# Patient Record
Sex: MALE | Race: WHITE | URBAN - METROPOLITAN AREA
[De-identification: names, ages, dates, MRNs, and addresses within clinical notes are randomized per-mention and may not be internally consistent; named-entity substitution may affect disease eponyms.]

---

## 2017-01-02 ENCOUNTER — THERAPY VISIT (OUTPATIENT)
Dept: PHYSICAL THERAPY | Facility: CLINIC | Age: 42
End: 2017-01-02
Payer: COMMERCIAL

## 2017-01-02 DIAGNOSIS — M25.511 ACUTE PAIN OF RIGHT SHOULDER: Primary | ICD-10-CM

## 2017-01-02 PROCEDURE — 97110 THERAPEUTIC EXERCISES: CPT | Mod: GP | Performed by: PHYSICAL THERAPIST

## 2017-01-02 PROCEDURE — 97112 NEUROMUSCULAR REEDUCATION: CPT | Mod: GP | Performed by: PHYSICAL THERAPIST

## 2017-01-02 NOTE — PROGRESS NOTES
Subjective:    HPI                    Objective:    System    Physical Exam    General     ROS    Assessment/Plan:      PROGRESS  REPORT    Progress reporting period is from Nov 3, 2016 to January 2, 2017. 3 visits .     SUBJECTIVE  Subjective: Prashant returns to therapy with chief complaint of minimal R shoulder pain yet when attemptingto reach, lift or lay on his R side at night. Overall is improved than 6 weeks ago . He notes that his compliance has been hit and miss with self care for the past 3-4 weeks due to holiday activity.      Current Pain level: 3/10   Initial Pain level: 5/10     Changes in function: No changes noted in function since last SOAP note   Adverse reactions: None;   ,         OBJECTIVE  Objective: Painful arc minimal present with FF and abduction. posterior reach is T7 vs T4, ERP. MMT reproduces shoulder pain with Adduction and IR. HE has golden slightly modified with high reps and low weight strengthening into a standing position based on good control of his scap mechanics and total ROM. Follow up planned in 2-4 weeks with potential DC at that point.       ASSESSMENT/PLAN  Updated problem list and treatment plan: Diagnosis 1:  R shoulder early OA, Impingememt     STG/LTGs have been met or progress has been made towards goals:  Yes (See Goal flow sheet completed today.)  Assessment of Progress: The patient's condition is improving.  The patient's condition has potential to improve.  Self Management Plans:  Patient has been instructed in a home treatment program.  I have re-evaluated this patient and find that the nature, scope, duration and intensity of the therapy is appropriate for the medical condition of the patient.        Recommendations:  This patient would benefit from continued therapy.     Frequency:  1 X a month, once daily  Duration:  for 2 months        Please refer to the daily flowsheet for treatment today, total treatment time and time spent performing 1:1 timed codes.